# Patient Record
Sex: FEMALE | Race: OTHER
[De-identification: names, ages, dates, MRNs, and addresses within clinical notes are randomized per-mention and may not be internally consistent; named-entity substitution may affect disease eponyms.]

---

## 2019-10-12 ENCOUNTER — HOSPITAL ENCOUNTER (EMERGENCY)
Dept: HOSPITAL 62 - ER | Age: 19
Discharge: HOME | End: 2019-10-12
Payer: OTHER GOVERNMENT

## 2019-10-12 VITALS — SYSTOLIC BLOOD PRESSURE: 110 MMHG | DIASTOLIC BLOOD PRESSURE: 72 MMHG

## 2019-10-12 DIAGNOSIS — R10.13: ICD-10-CM

## 2019-10-12 DIAGNOSIS — F17.200: ICD-10-CM

## 2019-10-12 DIAGNOSIS — N30.01: Primary | ICD-10-CM

## 2019-10-12 LAB
ADD MANUAL DIFF: NO
ALBUMIN SERPL-MCNC: 4.5 G/DL (ref 3.5–5)
ALP SERPL-CCNC: 62 U/L (ref 38–126)
ANION GAP SERPL CALC-SCNC: 11 MMOL/L (ref 5–19)
APPEARANCE UR: (no result)
APTT PPP: YELLOW S
AST SERPL-CCNC: 14 U/L (ref 14–36)
BASOPHILS # BLD AUTO: 0 10^3/UL (ref 0–0.2)
BASOPHILS NFR BLD AUTO: 0.6 % (ref 0–2)
BILIRUB DIRECT SERPL-MCNC: 0.1 MG/DL (ref 0–0.4)
BILIRUB SERPL-MCNC: 0.4 MG/DL (ref 0.2–1.3)
BILIRUB UR QL STRIP: NEGATIVE
BUN SERPL-MCNC: 4 MG/DL (ref 7–20)
CALCIUM: 10 MG/DL (ref 8.4–10.2)
CHLORIDE SERPL-SCNC: 104 MMOL/L (ref 98–107)
CO2 SERPL-SCNC: 25 MMOL/L (ref 22–30)
EOSINOPHIL # BLD AUTO: 0 10^3/UL (ref 0–0.6)
EOSINOPHIL NFR BLD AUTO: 0.7 % (ref 0–6)
ERYTHROCYTE [DISTWIDTH] IN BLOOD BY AUTOMATED COUNT: 14.3 % (ref 11.5–14)
GLUCOSE SERPL-MCNC: 113 MG/DL (ref 75–110)
GLUCOSE UR STRIP-MCNC: NEGATIVE MG/DL
HCT VFR BLD CALC: 37.1 % (ref 36–47)
HGB BLD-MCNC: 12.4 G/DL (ref 12–15.5)
KETONES UR STRIP-MCNC: (no result) MG/DL
LYMPHOCYTES # BLD AUTO: 1.1 10^3/UL (ref 0.5–4.7)
LYMPHOCYTES NFR BLD AUTO: 19.6 % (ref 13–45)
MCH RBC QN AUTO: 28.3 PG (ref 27–33.4)
MCHC RBC AUTO-ENTMCNC: 33.3 G/DL (ref 32–36)
MCV RBC AUTO: 85 FL (ref 80–97)
MONOCYTES # BLD AUTO: 0.4 10^3/UL (ref 0.1–1.4)
MONOCYTES NFR BLD AUTO: 6.8 % (ref 3–13)
NEUTROPHILS # BLD AUTO: 4.1 10^3/UL (ref 1.7–8.2)
NEUTS SEG NFR BLD AUTO: 72.3 % (ref 42–78)
PH UR STRIP: 6 [PH] (ref 5–9)
PLATELET # BLD: 293 10^3/UL (ref 150–450)
POTASSIUM SERPL-SCNC: 3.6 MMOL/L (ref 3.6–5)
PROT SERPL-MCNC: 7.6 G/DL (ref 6.3–8.2)
PROT UR STRIP-MCNC: NEGATIVE MG/DL
RBC # BLD AUTO: 4.37 10^6/UL (ref 3.72–5.28)
SP GR UR STRIP: 1.01
TOTAL CELLS COUNTED % (AUTO): 100 %
UROBILINOGEN UR-MCNC: NEGATIVE MG/DL (ref ?–2)
WBC # BLD AUTO: 5.7 10^3/UL (ref 4–10.5)

## 2019-10-12 PROCEDURE — 83690 ASSAY OF LIPASE: CPT

## 2019-10-12 PROCEDURE — 76705 ECHO EXAM OF ABDOMEN: CPT

## 2019-10-12 PROCEDURE — 85025 COMPLETE CBC W/AUTO DIFF WBC: CPT

## 2019-10-12 PROCEDURE — 36415 COLL VENOUS BLD VENIPUNCTURE: CPT

## 2019-10-12 PROCEDURE — 87186 SC STD MICRODIL/AGAR DIL: CPT

## 2019-10-12 PROCEDURE — 81025 URINE PREGNANCY TEST: CPT

## 2019-10-12 PROCEDURE — 81001 URINALYSIS AUTO W/SCOPE: CPT

## 2019-10-12 PROCEDURE — 80053 COMPREHEN METABOLIC PANEL: CPT

## 2019-10-12 PROCEDURE — 96374 THER/PROPH/DIAG INJ IV PUSH: CPT

## 2019-10-12 PROCEDURE — 99284 EMERGENCY DEPT VISIT MOD MDM: CPT

## 2019-10-12 PROCEDURE — 87086 URINE CULTURE/COLONY COUNT: CPT

## 2019-10-12 PROCEDURE — 87088 URINE BACTERIA CULTURE: CPT

## 2019-10-12 NOTE — ER DOCUMENT REPORT
ED General





- General


Chief Complaint: Abdominal Pain


Stated Complaint: ABDOMINAL PAIN


Time Seen by Provider: 10/12/19 12:25


Primary Care Provider: 


RASHARD BAIRES MD [Primary Care Provider] - Follow up as needed


Notes: 





19-year-old female presents emergency department complaining of epigastric 

abdominal pain that onset yesterday and worsened today after eating.  Describes 

it as a sharp stabbing pain that occasionally radiates to her left and right 

upper quadrant.  It worsens with food, is associated with nausea and vomiting in

the past but no vomiting today.  States is been going on for years.  Denies any 

association with spicy or greasy food, just associates it with all foods 

intermittently.  Denies any diarrhea, denies any fever.  Denies any family 

history of biliary disease.





Denies any dysuria, admits frequency.





- Related Data


Allergies/Adverse Reactions: 


                                        





No Known Allergies Allergy (Verified 10/12/19 12:28)


   











Past Medical History





- General


Information source: Patient





- Social History


Smoking Status: Current Every Day Smoker


Frequency of alcohol use: Rare


Drug Abuse: None


Lives with: Spouse/Significant other


Family History: None


Patient has suicidal ideation: No


Patient has homicidal ideation: No





Review of Systems





- Review of Systems


Gastrointestinal: See HPI


Female Genitourinary: See HPI


-: Yes All other systems reviewed and negative





Physical Exam





- Vital signs


Vitals: 





                                        











Temp Pulse Resp BP Pulse Ox


 


 99.0 F   78   14   124/76   100 


 


 10/12/19 12:20  10/12/19 12:20  10/12/19 12:20  10/12/19 12:20  10/12/19 12:20











Interpretation: Normal





- Notes


Notes: 





GENERAL: Alert, interacts well.  No acute distress.


HEAD: Normocephalic, atraumatic


EYES: Pupils equal, round and reactive to light, extraocular movements intact.


ENT: Oral mucosa moist, tongue midline. 


NECK: Full range of motion, supple, trachea midline.


LUNGS: Clear to auscultation bilaterally, no wheezes, rales or rhonchi, no 

respiratory distress.


HEART: Regular rate and rhythm, no murmurs, gallops, rubs.  


ABDOMEN: Soft, normal epigastric tenderness palpation, nondistended, bowel 

sounds present in all 4 quadrants.  


EXTREMITIES: Moves all 4 extremities spontaneously, no edema, radial and 

dorsalis pedis pulses 2/4 bilaterally.  No cyanosis.  


NEUROLOGICAL: Alert and oriented x3, normal speech.


PSYCH: Normal mood, normal affect.


SKIN: Warm, Dry, normal turgor, no rashes or lesions noted.





Course





- Re-evaluation


Re-evalutation: 





10/12/19 13:52


CBC unremarkable, CMP unremarkable, urinalysis shows trace ketones, small blood,

large leukocyte esterase, this is been sent reflexively for culture, this would 

explain her frequency, does not have anything to do with the epigastric pain, 

patient did have a right upper quadrant ultrasound which did not show any signs 

of cholelithiasis or cholecystitis.  Discussed with patient that there are 

multiple potential etiologies to her pain including gastritis which we will 

treat with Pepcid, biliary colic or nonfunctioning gallbladder which could be 

diagnosed with HIDA scan as an outpatient for potentially constipation which 

could be causing the left upper quadrant abdominal pain, patient admits to 

frequent constipation.  Recommended to use MiraLAX.  Patient's urinary tract 

infection will be treated with Macrobid.  Discharged home.





- Vital Signs


Vital signs: 





                                        











Temp Pulse Resp BP Pulse Ox


 


 99.0 F   78   14   124/76   100 


 


 10/12/19 12:20  10/12/19 12:20  10/12/19 12:20  10/12/19 12:20  10/12/19 12:20














- Laboratory


Result Diagrams: 


                                 10/12/19 12:43





                                 10/12/19 12:43


Laboratory results interpreted by me: 





                                        











  10/12/19 10/12/19 10/12/19





  12:30 12:43 12:43


 


RDW   14.3 H 


 


BUN    4 L


 


Glucose    113 H


 


Urine Ketones  TRACE H  


 


Urine Blood  SMALL H  


 


Leukocyte Esterase Rfl  LARGE H  














Discharge





- Discharge


Clinical Impression: 


 Epigastric abdominal pain





Acute cystitis


Qualifiers:


 Hematuria presence: with hematuria Qualified Code(s): N30.01 - Acute cystitis 

with hematuria





Condition: Stable


Disposition: HOME, SELF-CARE


Additional Instructions: 


Please dissolve 1 scoop of MiraLAX in a glass of water once a day to treat 

constipation.  You may increase to twice a day if needed to create soft bowel mo

vements and you may decrease to every other day if you develop diarrhea.  





I have written new prescription for Pepcid.  You may also take the 

over-the-counter antacid of your choice such as Zantac, Pepcid, Protonix.





If you continue to have pain after using MiraLAX and Pepcid for at least 2 weeks

please follow-up with your primary care provider as an outpatient to consider a 

HIDA scan to look for how well your gallbladder is functioning.





I have also prescribed you antibiotics in the form of Macrobid to help with your

urinary tract infection.


Prescriptions: 


Nitrofurantoin Macrocrystal [Macrodantin] 100 mg PO BID #20 capsule


Polyethylene Glycol 3350 [Miralax Powder 17 gm/Packet] 1 packet PO DAILY #1 pkg


Famotidine [Pepcid 20 mg Tablet] 20 mg PO BID #60 tablet


Referrals: 


RASHARD BAIRES MD [Primary Care Provider] - Follow up as needed

## 2019-10-12 NOTE — RADIOLOGY REPORT (SQ)
EXAM DESCRIPTION:  U/S ABDOMEN LIMITED W/O DOP



COMPLETED DATE/TIME:  10/12/2019 1:06 pm



REASON FOR STUDY:  epigastric pain, n/v



COMPARISON:  None.



TECHNIQUE:  Dynamic and static grayscale images acquired of the abdomen and recorded on PACS. Additio
nal selected color Doppler and spectral images recorded.



LIMITATIONS:  None.



FINDINGS:  PANCREAS: No masses.  Visualized pancreatic duct normal caliber.

LIVER: No masses. Echotexture normal.

LIVER VASCULATURE: Normal directional flow of the main portal vein and hepatic veins.

GALLBLADDER: No stones. Normal wall thickness. No pericholecystic fluid.

ULTRASOUND-DETECTED ZEPEDA'S SIGN: Negative.

INTRAHEPATIC DUCTS AND COMMON DUCT: CBD and intrahepatic ducts normal caliber. No filling defects.

INFERIOR VENA CAVA: Normal flow.

AORTA: No aneurysm.

RIGHT KIDNEY:  Normal size. Normal echogenicity. No solid or suspicious masses. No hydronephrosis. No
 calcifications.

PERITONEAL AND RIGHT PLEURAL SPACE: No ascites or effusions.

OTHER: No other significant findings.



IMPRESSION:  NORMAL RIGHT UPPER QUADRANT ULTRASOUND.



TECHNICAL DOCUMENTATION:  JOB ID:  [id number]

 [employer]- All Rights Reserved



Reading location - IP/workstation name: [email or web address]

## 2019-10-12 NOTE — ER DOCUMENT REPORT
ED Medical Screen (RME)





- General


Chief Complaint: Abdominal Pain


Stated Complaint: ABDOMINAL PAIN


Time Seen by Provider: 10/12/19 12:25





- HPI


Notes: 





10/12/19 12:26


Patient is a 19-year-old female who presents complaining of epigastric abdominal

pain with associated nausea and vomiting and decreased p.o. intake that began 

yesterday.  Patient states that pain worsened with food as well.  She is 

otherwise urinating normally and having normal bowel movements.  Denies 

pregnancy.  No vaginal discharge, odor, or bleeding.  Pain does not radiate.  No

fever.





I have treated and performed a rapid initial assessment of this patient.  A 

comprehensive ED assessment and evaluation of the patient, analysis of test 

results and completion of medical decision making process will be conducted by 

additional ED providers.





PHYSICAL EXAMINATION:





GENERAL: Well-appearing, well-nourished and in no acute distress.  A&Ox4.  

Answers questions appropriately.


Abdomen: Tenderness the epigastric palpation but limited exam in triage.





Physical Exam





- Vital signs


Vitals: 





                                        











Temp Pulse Resp BP Pulse Ox


 


 99.0 F   78   14   124/76   100 


 


 10/12/19 12:20  10/12/19 12:20  10/12/19 12:20  10/12/19 12:20  10/12/19 12:20














Course





- Vital Signs


Vital signs: 





                                        











Temp Pulse Resp BP Pulse Ox


 


 99.0 F   78   14   124/76   100 


 


 10/12/19 12:20  10/12/19 12:20  10/12/19 12:20  10/12/19 12:20  10/12/19 12:20